# Patient Record
Sex: FEMALE | Race: WHITE | NOT HISPANIC OR LATINO | Employment: OTHER | URBAN - METROPOLITAN AREA
[De-identification: names, ages, dates, MRNs, and addresses within clinical notes are randomized per-mention and may not be internally consistent; named-entity substitution may affect disease eponyms.]

---

## 2019-08-29 ENCOUNTER — CLINICAL SUPPORT (OUTPATIENT)
Dept: GASTROENTEROLOGY | Facility: CLINIC | Age: 74
End: 2019-08-29

## 2019-08-29 VITALS — HEIGHT: 65 IN | WEIGHT: 152 LBS | BODY MASS INDEX: 25.33 KG/M2

## 2019-08-29 DIAGNOSIS — Z86.010 HISTORY OF COLON POLYPS: Primary | ICD-10-CM

## 2019-08-29 RX ORDER — MELATONIN
1000 DAILY
COMMUNITY

## 2019-08-29 RX ORDER — LANOLIN ALCOHOL/MO/W.PET/CERES
1 CREAM (GRAM) TOPICAL 2 TIMES DAILY
COMMUNITY

## 2019-08-29 NOTE — PROGRESS NOTES
Pt seen for colon prep dx history of polyps  Meds reviewed with pt  Instructions given  Screening form signed  Clenpiq sample given to pt   Colon 9/17/19 GS

## 2019-09-23 ENCOUNTER — TELEPHONE (OUTPATIENT)
Dept: GASTROENTEROLOGY | Facility: CLINIC | Age: 74
End: 2019-09-23

## 2019-09-23 NOTE — TELEPHONE ENCOUNTER
Called patient regarding results from colonoscopy performed on 09/17/2019 for follow-up of piecemeal polypectomy of tubulovillous adenoma in the hepatic flexure in 2018  Rectal polyp is hyperplastic  Tubulovillous adenoma that was piecemeal resected last year  Instructed patient to follow up with us in the office as needed      RECALL:  3 years

## 2023-06-12 ENCOUNTER — TELEPHONE (OUTPATIENT)
Dept: GASTROENTEROLOGY | Facility: CLINIC | Age: 78
End: 2023-06-12

## 2023-06-12 ENCOUNTER — OFFICE VISIT (OUTPATIENT)
Dept: GASTROENTEROLOGY | Facility: CLINIC | Age: 78
End: 2023-06-12
Payer: COMMERCIAL

## 2023-06-12 VITALS
WEIGHT: 144 LBS | SYSTOLIC BLOOD PRESSURE: 116 MMHG | HEIGHT: 66 IN | BODY MASS INDEX: 23.14 KG/M2 | DIASTOLIC BLOOD PRESSURE: 78 MMHG

## 2023-06-12 DIAGNOSIS — D12.6 TUBULOVILLOUS ADENOMA OF COLON: ICD-10-CM

## 2023-06-12 DIAGNOSIS — Z86.010 HISTORY OF COLON POLYPS: Primary | ICD-10-CM

## 2023-06-12 PROCEDURE — 99204 OFFICE O/P NEW MOD 45 MIN: CPT | Performed by: INTERNAL MEDICINE

## 2023-06-12 NOTE — PROGRESS NOTES
[id number] - Outpatient Consultation  David Mueller 69 y o  female MRN: [id number]  Encounter: [id number]    ASSESSMENT AND PLAN:      1  History of colon polyps  78F referred to us by Dr Jennifer Wilcox for surveillance colonoscopy  Had a colonoscopy in 2018 showing tubulovillous adenoma @ hepatic flexure  Pt was for a 3 year recall but with the pandemic and losing her  to kidney cancer, she is here today  Otherwise healthy, and overdue so we will reschedule a colonoscopy today  Risks and benefits discussed  - Colonoscopy; Future  - sodium picosulfate, magnesium oxide, citric acid (Clenpiq) oral solution; Take 175 mL by mouth once for 1 dose Take 175 mL by mouth once for 1 dose  Dispense: 175 mL; Refill: 0    2  Tubulovillous adenoma of colon        Followup Appointment: prn  ______________________________________________________________________    Chief Complaint   Patient presents with   • Colon Polyps       HPI:   David Mueller is a 69y o  year old female who presents today at the request of Dr Jennifer Wilcox for colonoscopy  H/o TV adenoma  Had a difficult past 5 years with the covid pandemic and losing her  to kidney cancer  Currently denies any sig GI issues  Eating well and no issues w BMs  No GIB/dysphagia/abd pain/n/v/heartburn      Historical Information   Past Medical History:   Diagnosis Date   • Osteoporosis      Past Surgical History:   Procedure Laterality Date   • COLONOSCOPY  2018     Social History     Substance and Sexual Activity   Alcohol Use Never     Social History     Substance and Sexual Activity   Drug Use Never     Social History     Tobacco Use   Smoking Status Never   Smokeless Tobacco Never     Family History   Problem Relation Age of Onset   • Colon polyps Sister    • Colon cancer Neg Hx        Meds/Allergies     Current Outpatient Medications:   •  b complex vitamins tablet  •  calcium citrate-vitamin D (CITRACAL+D) 315-200 MG-UNIT per tablet  • " cholecalciferol (VITAMIN D3) 1,000 units tablet  •  Magnesium 100 MG CAPS  •  Multiple Vitamins-Minerals (CENTRUM SILVER 50+WOMEN PO)  •  Probiotic Product (PROBIOTIC-10 PO)  •  sodium picosulfate, magnesium oxide, citric acid (Clenpiq) oral solution    Allergies   Allergen Reactions   • Erythromycin Hives   • Kenalog [Triamcinolone]        PHYSICAL EXAM:    Blood pressure 116/78, height 5' 5 5\" (1 664 m), weight 65 3 kg (144 lb)  Body mass index is 23 6 kg/m²  General Appearance: NAD, cooperative, alert  Eyes: Anicteric, PERRLA, EOMI  ENT:  Normocephalic, atraumatic, normal mucosa  Neck:  Supple, symmetrical, trachea midline,   Resp:  Clear to auscultation bilaterally; no rales, rhonchi or wheezing; respirations unlabored   CV:  S1 S2, Regular rate and rhythm; no murmur, rub, or gallop  GI:  Soft, non-tender, non-distended; normal bowel sounds; no masses, no organomegaly   Rectal: Deferred  Musculoskeletal: No cyanosis, clubbing or edema  Normal ROM  Skin:  No jaundice, rashes, or lesions   Heme/Lymph: No palpable cervical lymphadenopathy  Psych: Normal affect, good eye contact  Neuro: No gross deficits, AAOx3    Lab Results:   No results found for: \"HCT\", \"HGB\", \"MCV\", \"PLT\", \"WBC\"  No results found for: \"ALKPHOS\", \"ALT\", \"ANIONGAP\", \"AST\", \"BILITOT\", \"BUN\", \"CALCIUM\", \"CL\", \"CO2\", \"CORRECTEDCA\", \"CREATININE\", \"EGFR\", \"GLUCOSE\", \"GLUF\", \"K\", \"NA\", \"PROT\"  No results found for: \"FERRITIN\", \"IRON\", \"TIBC\"  No results found for: \"LIPASE\"    Radiology Results:   No results found  REVIEW OF SYSTEMS:    CONSTITUTIONAL: Denies any fever, chills, rigors, and weight loss  HEENT: No earache or tinnitus  Denies hearing loss or visual disturbances  CARDIOVASCULAR: No chest pain or palpitations  RESPIRATORY: Denies any cough, hemoptysis, shortness of breath or dyspnea on exertion  GASTROINTESTINAL: As noted in the History of Present Illness  GENITOURINARY: No problems with urination   Denies any hematuria or " dysuria  NEUROLOGIC: No dizziness or vertigo, denies headaches  MUSCULOSKELETAL: Denies any muscle or joint pain  SKIN: Denies skin rashes or itching  ENDOCRINE: Denies excessive thirst  Denies intolerance to heat or cold  PSYCHOSOCIAL: Denies depression or anxiety  Denies any recent memory loss

## 2023-06-12 NOTE — TELEPHONE ENCOUNTER
Scheduled date of colonoscopy (as of today): 06/26/23  Physician performing colonoscopy: Dr Xuan Burroughs  Location of colonoscopy: [facility]  Bowel prep reviewed with patient: Clenpiq and was told she was getting a sample in about a week  Instructions reviewed with patient by: Gave the patient the folder to rake home and read  Clearances: No

## 2023-06-26 ENCOUNTER — ANESTHESIA EVENT (OUTPATIENT)
Dept: GASTROENTEROLOGY | Facility: AMBULATORY SURGERY CENTER | Age: 78
End: 2023-06-26

## 2023-06-26 ENCOUNTER — HOSPITAL ENCOUNTER (OUTPATIENT)
Dept: GASTROENTEROLOGY | Facility: AMBULATORY SURGERY CENTER | Age: 78
Discharge: HOME | End: 2023-06-26
Attending: INTERNAL MEDICINE
Payer: COMMERCIAL

## 2023-06-26 ENCOUNTER — ANESTHESIA (OUTPATIENT)
Dept: GASTROENTEROLOGY | Facility: AMBULATORY SURGERY CENTER | Age: 78
End: 2023-06-26

## 2023-06-26 VITALS
HEIGHT: 66 IN | TEMPERATURE: 99.5 F | DIASTOLIC BLOOD PRESSURE: 74 MMHG | OXYGEN SATURATION: 99 % | BODY MASS INDEX: 22.5 KG/M2 | RESPIRATION RATE: 18 BRPM | SYSTOLIC BLOOD PRESSURE: 153 MMHG | HEART RATE: 64 BPM | WEIGHT: 140 LBS

## 2023-06-26 DIAGNOSIS — Z86.010 HISTORY OF COLON POLYPS: ICD-10-CM

## 2023-06-26 PROCEDURE — 45385 COLONOSCOPY W/LESION REMOVAL: CPT | Performed by: INTERNAL MEDICINE

## 2023-06-26 PROCEDURE — 88305 TISSUE EXAM BY PATHOLOGIST: CPT | Performed by: PATHOLOGY

## 2023-06-26 PROCEDURE — 45380 COLONOSCOPY AND BIOPSY: CPT | Performed by: INTERNAL MEDICINE

## 2023-06-26 RX ORDER — SODIUM CHLORIDE, SODIUM LACTATE, POTASSIUM CHLORIDE, CALCIUM CHLORIDE 600; 310; 30; 20 MG/100ML; MG/100ML; MG/100ML; MG/100ML
50 INJECTION, SOLUTION INTRAVENOUS CONTINUOUS
Status: DISCONTINUED | OUTPATIENT
Start: 2023-06-26 | End: 2023-06-26

## 2023-06-26 RX ORDER — PROPOFOL 10 MG/ML
INJECTION, EMULSION INTRAVENOUS AS NEEDED
Status: DISCONTINUED | OUTPATIENT
Start: 2023-06-26 | End: 2023-06-26

## 2023-06-26 RX ADMIN — SODIUM CHLORIDE, SODIUM LACTATE, POTASSIUM CHLORIDE, CALCIUM CHLORIDE 50 ML/HR: 600; 310; 30; 20 INJECTION, SOLUTION INTRAVENOUS at 08:42

## 2023-06-26 RX ADMIN — PROPOFOL 100 MG: 10 INJECTION, EMULSION INTRAVENOUS at 09:06

## 2023-06-26 RX ADMIN — PROPOFOL 100 MG: 10 INJECTION, EMULSION INTRAVENOUS at 08:58

## 2023-06-26 RX ADMIN — PROPOFOL 50 MG: 10 INJECTION, EMULSION INTRAVENOUS at 09:11

## 2023-06-26 RX ADMIN — SODIUM CHLORIDE, SODIUM LACTATE, POTASSIUM CHLORIDE, CALCIUM CHLORIDE: 600; 310; 30; 20 INJECTION, SOLUTION INTRAVENOUS at 09:16

## 2023-06-26 RX ADMIN — PROPOFOL 100 MG: 10 INJECTION, EMULSION INTRAVENOUS at 09:02

## 2023-06-26 NOTE — ANESTHESIA PREPROCEDURE EVALUATION
Procedure:  COLONOSCOPY    Relevant Problems   ANESTHESIA (within normal limits)   (-) History of anesthesia complications      CARDIO   (-) Chest pain   (-) WHYTE (dyspnea on exertion)      PULMONARY   (-) Shortness of breath   (-) URI (upper respiratory infection)        Physical Exam    Airway    Mallampati score: II  TM Distance: >3 FB  Neck ROM: full     Dental       Cardiovascular      Pulmonary      Other Findings        Anesthesia Plan  ASA Score- 1     Anesthesia Type- IV sedation with anesthesia with ASA Monitors  Additional Monitors:   Airway Plan:           Plan Factors-Exercise tolerance (METS): >4 METS  Chart reviewed  EKG reviewed  Existing labs reviewed  Patient summary reviewed  Induction- intravenous  Postoperative Plan-     Informed Consent- Anesthetic plan and risks discussed with patient  I personally reviewed this patient with the CRNA  Discussed and agreed on the Anesthesia Plan with the CRNA  Enid Leone

## 2023-06-26 NOTE — ANESTHESIA POSTPROCEDURE EVALUATION
Post-Op Assessment Note    CV Status:  Stable  Pain Score: 0    Pain management: adequate     Mental Status:  Alert and awake   Hydration Status:  Euvolemic   PONV Controlled:  Controlled   Airway Patency:  Patent      Post Op Vitals Reviewed: Yes      Staff: CRNA         No notable events documented      BP   128/70   Temp   98   Pulse  77   Resp  16    SpO2   98

## 2023-06-26 NOTE — H&P
"History and Physical - [facility]    Ganga Alba 69 y o  female MRN: [id number]      HPI: Ganga Alba is a 69y o  year old female who presents for h/o tubulovillous adenoma in 2019 - here for surveillance  Allergies   Allergen Reactions   • Erythromycin Hives   • Kenalog [Triamcinolone] Hives     Knee injection: Face red, hot, itching, hives         REVIEW OF SYSTEMS: Per the HPI, and otherwise unremarkable  Historical Information     Past Medical History:   Diagnosis Date   • Arthritis    • Colon polyp    • Osteoporosis      Past Surgical History:   Procedure Laterality Date   • APPENDECTOMY     • BUNIONECTOMY     • COLONOSCOPY  2018   • REPLACEMENT TOTAL KNEE Right      Social History   Social History     Substance and Sexual Activity   Alcohol Use Yes    Comment: rare     Social History     Substance and Sexual Activity   Drug Use Never     Social History     Tobacco Use   Smoking Status Never   Smokeless Tobacco Never     Family History   Problem Relation Age of Onset   • Colon polyps Sister    • Colon cancer Neg Hx        Meds/Allergies       Current Outpatient Medications:   •  b complex vitamins tablet  •  calcium citrate-vitamin D (CITRACAL+D) 315-200 MG-UNIT per tablet  •  cholecalciferol (VITAMIN D3) 1,000 units tablet  •  Magnesium 100 MG CAPS  •  Multiple Vitamins-Minerals (CENTRUM SILVER 50+WOMEN PO)  •  Probiotic Product (PROBIOTIC-10 PO)        Objective     /70   Pulse 68   Temp 99 5 °F (37 5 °C) (Temporal)   Resp 15   Ht 5' 5 5\" (1 664 m)   Wt 63 5 kg (140 lb)   SpO2 98%   BMI 22 94 kg/m²       PHYSICAL EXAM    Gen: NAD AAOx3  Head: Normocephalic, Atraumatic  CV: S1S2 RRR no m/r/g  CHEST: Clear b/l no c/r/w  ABD: soft, +BS NT/ND no masses  EXT: no edema      ASSESSMENT/PLAN:  This is a 69y o  year old female here for colonoscopy, and she is stable and optimized for her procedure        "

## 2023-06-29 PROCEDURE — 88305 TISSUE EXAM BY PATHOLOGIST: CPT | Performed by: PATHOLOGY

## 2023-07-01 NOTE — RESULT ENCOUNTER NOTE
TK: RECALL none  NURSING: Please let the patient know that the polyps are completely benign and nothing to worry about  No further colonoscopy will be recommended at this point!

## 2023-07-03 ENCOUNTER — NURSE TRIAGE (OUTPATIENT)
Age: 78
End: 2023-07-03

## 2023-07-03 NOTE — TELEPHONE ENCOUNTER
TK: RECALL none.      NURSING: Please let the patient know that the polyps are completely benign and nothing to worry about. No further colonoscopy will be recommended at this point! Per Dr. Perla Dennis.

## 2023-07-03 NOTE — TELEPHONE ENCOUNTER
----- Message from JeanIntheGlogonzalez sent at 7/3/2023 11:50 AM EDT -----  Pt returning office call to discuss pathology results